# Patient Record
Sex: FEMALE | Race: WHITE | NOT HISPANIC OR LATINO | Employment: OTHER | URBAN - METROPOLITAN AREA
[De-identification: names, ages, dates, MRNs, and addresses within clinical notes are randomized per-mention and may not be internally consistent; named-entity substitution may affect disease eponyms.]

---

## 2023-12-10 DIAGNOSIS — K21.9 GASTROESOPHAGEAL REFLUX DISEASE WITHOUT ESOPHAGITIS: ICD-10-CM

## 2023-12-10 DIAGNOSIS — I10 ESSENTIAL HYPERTENSION: ICD-10-CM

## 2023-12-11 PROBLEM — K21.9 GASTROESOPHAGEAL REFLUX DISEASE: Status: ACTIVE | Noted: 2023-12-11

## 2023-12-11 PROBLEM — M54.2 NECK PAIN: Status: ACTIVE | Noted: 2023-12-11

## 2023-12-11 PROBLEM — F41.9 ANXIETY: Status: ACTIVE | Noted: 2023-12-11

## 2023-12-11 PROBLEM — R92.0 MAMMOGRAPHIC MICROCALCIFICATION FOUND ON DIAGNOSTIC IMAGING OF BREAST: Status: ACTIVE | Noted: 2023-12-11

## 2023-12-11 PROBLEM — E66.9 OBESITY (BMI 30.0-34.9): Status: ACTIVE | Noted: 2023-12-11

## 2023-12-11 PROBLEM — R92.8 ABNORMAL FINDINGS ON DIAGNOSTIC IMAGING OF BREAST: Status: ACTIVE | Noted: 2023-12-11

## 2023-12-11 PROBLEM — N60.19 FIBROCYSTIC BREAST CHANGES: Status: ACTIVE | Noted: 2023-12-11

## 2023-12-11 PROBLEM — N95.1 MENOPAUSAL SYMPTOMS: Status: ACTIVE | Noted: 2023-12-11

## 2023-12-11 PROBLEM — J01.90 ACUTE NON-RECURRENT SINUSITIS: Status: ACTIVE | Noted: 2023-12-11

## 2023-12-11 PROBLEM — G89.29 OTHER CHRONIC PAIN: Status: ACTIVE | Noted: 2023-12-11

## 2023-12-11 PROBLEM — M19.012 PRIMARY OSTEOARTHRITIS, LEFT SHOULDER: Status: ACTIVE | Noted: 2023-12-11

## 2023-12-11 PROBLEM — E78.2 MIXED HYPERLIPIDEMIA: Status: ACTIVE | Noted: 2023-12-11

## 2023-12-11 PROBLEM — N95.2 ATROPHIC VAGINITIS: Status: ACTIVE | Noted: 2023-12-11

## 2023-12-11 PROBLEM — I10 ESSENTIAL HYPERTENSION: Status: ACTIVE | Noted: 2023-12-11

## 2023-12-11 PROBLEM — F10.10 ALCOHOL ABUSE: Status: ACTIVE | Noted: 2023-12-11

## 2023-12-11 PROBLEM — M17.12 ARTHRITIS OF LEFT KNEE: Status: ACTIVE | Noted: 2023-12-11

## 2023-12-11 PROBLEM — N95.1 MENOPAUSAL STATE: Status: ACTIVE | Noted: 2023-12-11

## 2023-12-11 PROBLEM — E55.9 VITAMIN D INSUFFICIENCY: Status: ACTIVE | Noted: 2023-12-11

## 2023-12-11 PROBLEM — E66.811 OBESITY (BMI 30.0-34.9): Status: ACTIVE | Noted: 2023-12-11

## 2023-12-11 PROBLEM — R51.9 HEADACHE: Status: ACTIVE | Noted: 2023-12-11

## 2023-12-11 PROBLEM — G47.30 SLEEP APNEA: Status: ACTIVE | Noted: 2023-12-11

## 2023-12-11 PROBLEM — M25.512 PAIN IN LEFT SHOULDER: Status: ACTIVE | Noted: 2023-12-11

## 2023-12-11 PROBLEM — R92.1 MAMMOGRAPHIC CALCIFICATION FOUND ON DIAGNOSTIC IMAGING OF BREAST: Status: ACTIVE | Noted: 2023-12-11

## 2023-12-15 RX ORDER — METOPROLOL SUCCINATE 50 MG/1
50 TABLET, EXTENDED RELEASE ORAL DAILY
Qty: 90 TABLET | Refills: 0 | Status: SHIPPED | OUTPATIENT
Start: 2023-12-15 | End: 2024-03-22

## 2023-12-15 RX ORDER — OMEPRAZOLE 20 MG/1
CAPSULE, DELAYED RELEASE ORAL
Qty: 90 CAPSULE | Refills: 0 | Status: SHIPPED | OUTPATIENT
Start: 2023-12-15 | End: 2024-03-22

## 2023-12-15 RX ORDER — HYDROCHLOROTHIAZIDE 12.5 MG/1
12.5 CAPSULE ORAL
Qty: 90 CAPSULE | Refills: 0 | Status: SHIPPED | OUTPATIENT
Start: 2023-12-15 | End: 2024-03-22

## 2023-12-15 NOTE — TELEPHONE ENCOUNTER
Pt has been scheduled for a physical on 12/20/23. Pt is currently out of medication, are you able to send supply until her appt.

## 2023-12-20 ENCOUNTER — OFFICE VISIT (OUTPATIENT)
Dept: PRIMARY CARE | Facility: CLINIC | Age: 68
End: 2023-12-20
Payer: COMMERCIAL

## 2023-12-20 VITALS
HEIGHT: 69 IN | OXYGEN SATURATION: 98 % | SYSTOLIC BLOOD PRESSURE: 120 MMHG | TEMPERATURE: 97.2 F | WEIGHT: 198.2 LBS | DIASTOLIC BLOOD PRESSURE: 86 MMHG | BODY MASS INDEX: 29.36 KG/M2 | HEART RATE: 72 BPM

## 2023-12-20 DIAGNOSIS — Z11.59 NEED FOR HEPATITIS C SCREENING TEST: ICD-10-CM

## 2023-12-20 DIAGNOSIS — Z00.00 ROUTINE ADULT HEALTH MAINTENANCE: Primary | ICD-10-CM

## 2023-12-20 DIAGNOSIS — Z23 ENCOUNTER FOR IMMUNIZATION: ICD-10-CM

## 2023-12-20 DIAGNOSIS — E55.9 VITAMIN D INSUFFICIENCY: ICD-10-CM

## 2023-12-20 DIAGNOSIS — Z13.820 SCREENING FOR OSTEOPOROSIS: ICD-10-CM

## 2023-12-20 DIAGNOSIS — Z96.652 STATUS POST LEFT KNEE REPLACEMENT: ICD-10-CM

## 2023-12-20 DIAGNOSIS — Z78.0 POSTMENOPAUSAL STATE: ICD-10-CM

## 2023-12-20 DIAGNOSIS — Z12.31 BREAST CANCER SCREENING BY MAMMOGRAM: ICD-10-CM

## 2023-12-20 DIAGNOSIS — M19.012 PRIMARY OSTEOARTHRITIS, LEFT SHOULDER: ICD-10-CM

## 2023-12-20 DIAGNOSIS — L30.4 INTERTRIGO: ICD-10-CM

## 2023-12-20 DIAGNOSIS — E78.2 MIXED HYPERLIPIDEMIA: ICD-10-CM

## 2023-12-20 DIAGNOSIS — I10 ESSENTIAL HYPERTENSION: ICD-10-CM

## 2023-12-20 DIAGNOSIS — R73.03 PREDIABETES: ICD-10-CM

## 2023-12-20 DIAGNOSIS — K21.9 GASTROESOPHAGEAL REFLUX DISEASE, UNSPECIFIED WHETHER ESOPHAGITIS PRESENT: ICD-10-CM

## 2023-12-20 PROBLEM — R51.9 HEADACHE: Status: RESOLVED | Noted: 2023-12-11 | Resolved: 2023-12-20

## 2023-12-20 PROBLEM — R92.0 MAMMOGRAPHIC MICROCALCIFICATION FOUND ON DIAGNOSTIC IMAGING OF BREAST: Status: RESOLVED | Noted: 2023-12-11 | Resolved: 2023-12-20

## 2023-12-20 PROBLEM — J01.90 ACUTE NON-RECURRENT SINUSITIS: Status: RESOLVED | Noted: 2023-12-11 | Resolved: 2023-12-20

## 2023-12-20 PROBLEM — G89.29 CHRONIC LEFT SHOULDER PAIN: Status: ACTIVE | Noted: 2023-12-20

## 2023-12-20 PROBLEM — R92.1 MAMMOGRAPHIC CALCIFICATION FOUND ON DIAGNOSTIC IMAGING OF BREAST: Status: RESOLVED | Noted: 2023-12-11 | Resolved: 2023-12-20

## 2023-12-20 PROBLEM — M25.512 PAIN IN LEFT SHOULDER: Status: RESOLVED | Noted: 2023-12-11 | Resolved: 2023-12-20

## 2023-12-20 PROBLEM — M25.512 CHRONIC LEFT SHOULDER PAIN: Status: ACTIVE | Noted: 2023-12-20

## 2023-12-20 PROBLEM — M54.2 NECK PAIN: Status: RESOLVED | Noted: 2023-12-11 | Resolved: 2023-12-20

## 2023-12-20 PROBLEM — R92.8 ABNORMAL FINDINGS ON DIAGNOSTIC IMAGING OF BREAST: Status: RESOLVED | Noted: 2023-12-11 | Resolved: 2023-12-20

## 2023-12-20 PROBLEM — N95.1 MENOPAUSAL SYMPTOMS: Status: RESOLVED | Noted: 2023-12-11 | Resolved: 2023-12-20

## 2023-12-20 PROCEDURE — 1125F AMNT PAIN NOTED PAIN PRSNT: CPT | Performed by: FAMILY MEDICINE

## 2023-12-20 PROCEDURE — 3074F SYST BP LT 130 MM HG: CPT | Performed by: FAMILY MEDICINE

## 2023-12-20 PROCEDURE — 1159F MED LIST DOCD IN RCRD: CPT | Performed by: FAMILY MEDICINE

## 2023-12-20 PROCEDURE — 1160F RVW MEDS BY RX/DR IN RCRD: CPT | Performed by: FAMILY MEDICINE

## 2023-12-20 PROCEDURE — 1036F TOBACCO NON-USER: CPT | Performed by: FAMILY MEDICINE

## 2023-12-20 PROCEDURE — G0009 ADMIN PNEUMOCOCCAL VACCINE: HCPCS | Performed by: FAMILY MEDICINE

## 2023-12-20 PROCEDURE — 3079F DIAST BP 80-89 MM HG: CPT | Performed by: FAMILY MEDICINE

## 2023-12-20 PROCEDURE — 99215 OFFICE O/P EST HI 40 MIN: CPT | Mod: 25 | Performed by: FAMILY MEDICINE

## 2023-12-20 PROCEDURE — G0439 PPPS, SUBSEQ VISIT: HCPCS | Performed by: FAMILY MEDICINE

## 2023-12-20 RX ORDER — NYSTATIN 100000 [USP'U]/G
1 POWDER TOPICAL ONCE
Status: CANCELLED | OUTPATIENT
Start: 2023-12-20 | End: 2023-12-20

## 2023-12-20 RX ORDER — MELOXICAM 15 MG/1
15 TABLET ORAL DAILY
Qty: 90 TABLET | Refills: 1 | Status: SHIPPED | OUTPATIENT
Start: 2023-12-20 | End: 2024-06-17

## 2023-12-20 RX ORDER — NYSTATIN 100000 [USP'U]/G
1 POWDER TOPICAL 2 TIMES DAILY
Qty: 60 G | Refills: 1 | Status: SHIPPED | OUTPATIENT
Start: 2023-12-20 | End: 2024-01-19

## 2023-12-20 RX ORDER — MELOXICAM 15 MG/1
15 TABLET ORAL DAILY
COMMUNITY
Start: 2023-03-24 | End: 2023-12-20 | Stop reason: SDUPTHER

## 2023-12-20 RX ORDER — CYCLOBENZAPRINE HCL 5 MG
5 TABLET ORAL 3 TIMES DAILY PRN
Qty: 30 TABLET | Refills: 0 | Status: SHIPPED | OUTPATIENT
Start: 2023-12-20 | End: 2023-12-30

## 2023-12-20 ASSESSMENT — PAIN SCALES - GENERAL: PAINLEVEL: 10-WORST PAIN EVER

## 2023-12-20 ASSESSMENT — PATIENT HEALTH QUESTIONNAIRE - PHQ9
1. LITTLE INTEREST OR PLEASURE IN DOING THINGS: NOT AT ALL
2. FEELING DOWN, DEPRESSED OR HOPELESS: NOT AT ALL
SUM OF ALL RESPONSES TO PHQ9 QUESTIONS 1 AND 2: 0

## 2023-12-20 ASSESSMENT — ENCOUNTER SYMPTOMS
LOSS OF SENSATION IN FEET: 0
DEPRESSION: 0
OCCASIONAL FEELINGS OF UNSTEADINESS: 0

## 2023-12-20 NOTE — PATIENT INSTRUCTIONS
Problem List Items Addressed This Visit             ICD-10-CM    Gastroesophageal reflux disease K21.9     - Stable         Essential hypertension I10     - Blood pressure stable on current regimen  -Will continue on current medications along with healthy, balanced diet with moderation of salt/caffeine/alcohol         Relevant Orders    TSH with reflex to Free T4 if abnormal    Lipid Panel    Comprehensive Metabolic Panel    CBC    Mixed hyperlipidemia E78.2    Relevant Orders    TSH with reflex to Free T4 if abnormal    Lipid Panel    Comprehensive Metabolic Panel    Primary osteoarthritis, left shoulder M19.012     - Will continue with meloxicam and as needed muscle relaxant secondary to acute flare  -Continue with additional supportive care including modified activity, ice/heat and additional Tylenol as needed  -Will plan to scope Ortho consultation after everything has been visually finished with your knee         Relevant Medications    meloxicam (Mobic) 15 mg tablet    cyclobenzaprine (Flexeril) 5 mg tablet    Vitamin D insufficiency E55.9     - Will monitor vitamin D level as levels were elevated above normal with last panel completed in March         Relevant Orders    Vitamin D 25-Hydroxy,Total (for eval of Vitamin D levels)    Prediabetes R73.03     - Will check A1c sugar average with blood work ordered today  -Continue focus on healthy, low-fat with moderation of carbohydrates         Relevant Orders    Hemoglobin A1c    Status post left knee replacement Z96.652     - Continue with physical therapy exercises per protocol         Intertrigo L30.4     - Will trial prescription grade antifungal powder with dermatology referral placed         Relevant Medications    nystatin (Mycostatin) 100,000 unit/gram powder    Other Relevant Orders    Referral to Dermatology     Other Visit Diagnoses         Codes    Routine adult health maintenance    -  Primary Z00.00    Relevant Orders    TSH with reflex to Free T4 if  abnormal    Lipid Panel    Comprehensive Metabolic Panel    CBC    Hepatitis C antibody    Pneumococcal conjugate vaccine, 20-valent (PREVNAR 20)    BI mammo bilateral screening tomosynthesis    XR DEXA bone density    Breast cancer screening by mammogram     Z12.31    Relevant Orders    BI mammo bilateral screening tomosynthesis    Need for hepatitis C screening test     Z11.59    Relevant Orders    Hepatitis C antibody    Encounter for immunization     Z23    Relevant Orders    Pneumococcal conjugate vaccine, 20-valent (PREVNAR 20)    Postmenopausal state     Z78.0    Relevant Orders    XR DEXA bone density    Screening for osteoporosis     Z13.820    Relevant Orders    XR DEXA bone density            Additional Visit Plans:  Notes:  PREVENTATIVE HEALTH SCREENINGS INCLUDED:  - Blood pressure screen.  - Blood work may include a cholesterol and diabetes screen if risk factors exist (overweight, high blood pressure etc); screening for sexually transmitted infections; a one time HIV screen for all individuals, and a one time Hepatitis C Virus screen for those born between 1636-5135.  - I encourage you to eat a low-fat, moderate-carbohydrate, low-calorie diet to maintain a normal BMI (under 25) to reduce heart disease, and risk for diabetes  - Moderate intensity exercise for 30 minutes 5 days per week is recommended  - Along with recommendations for nutrition and exercise discussed today helpful resources recommended by the American Academy of Family Practice can be found at www.familydoctor.org or www.choosemyplate.gov    - Colon cancer screening for all ages 45-75 or 85 years old periodically depending on results.  - Cervical cancer screening (pap test) in women between 21-65 years old, periodically depending on results.  - Mammogram screening for breast cancer in women starting at 40-50 years and every 1-2 years.  - For men and women who have a 30 pack year smoking history and currently smoke or have quit in the  past 15 years, screening for lung cancer with a yearly low dose CT scan is recommended starting at age 55 until age 80 years  - For men only who have smoked 100+ cigarettes anytime in their lifetime, a one time ultrasound screening for for abdominal aortic aneurysms starting at age 65 until 75 years old  - Bone density screening (DEXA) for osteoporosis in women aged 65 years and older, once every two years if needed.    IMMUNIZATIONS:  - Flu shot annually.  - Tetanus booster every 10 years.  - Two pneumococcal vaccinations after 65 years old.  - Shingles vaccine for those 50 years or older.     This was a shared decision making visit.    Next Wellness Exam Due  In 1 year from today      Erick Porter,    12/20/23   11:11 AM

## 2023-12-20 NOTE — PROGRESS NOTES
Outpatient Visit Note    Chief Complaint   Patient presents with    Annual Exam    Med Refill       HPI:  Christina Arrington is a 68 y.o. female who presents to the office for an annual Medicare Wellness Visit.    She was last seen in the office on 8/15/2023 by Dr. Bunn secondary to foot/ankle complaints.    Past medical history significant for hypertension, hyperlipidemia, prediabetes, anxiety, GERD, osteoarthritis, CAITLYN and vitamin D deficiency.    Of note, patient did undergo left knee replacement in November at [facility].    Last panel blood work on file completed in March 2023 including A1c, CBC, CMP, lipid panel, TSH, vitamin D and vitamin B12.  Blood work was remarkable for elevated H&H with hyperglycemia and prediabetic A1c of 5.7%.  Blood work additionally noted hyperlipidemia with elevated vitamin B12 and vitamin D levels.    Well exam:  Overall, she describes her health as good with no reports of recent illness or hospitalization.  She states that her diet is fair though she has continued to struggle with her weight. In regards to physical activity, she has remained restricted following her left knee replacement though has made significant strides with physical therapy and hopes to increase activity going forward.  She denies any significant sleep complaints.  She denies issues of chest pain, shortness of breath, headaches, vision/hearing changes, abdominal pain, vomiting, diarrhea, melena, hematochezia, constipation or urinary symptoms.       Preventative Health Maintenance:  In regards to preventative health maintenance, last Tdap received unknown. Flu shot due at this time. Pneumonia vaccination series not previously initiated per records. In regards to CRC screening, no prior screening on file though she does admit to having successfully completed a colonoscopy through [facility] within the last 5 years. Shingles vaccination not previously needed initiated to date.  COVID-19 vaccine  series completed with patient currently due for booster. Last Mammogram completed in 2021 per chart records. Last Pap completed in 2021. DEXA scan not completed to date per chart notes.    Does continue to struggle with chronic left shoulder osteoarthritis.  Does state to have flared up her shoulder pain recently after moving furniture at her father's house.  Continues to utilize meloxicam and Tylenol though recent pain has been intense.  Does admit to sleep disruption.  Has previously utilized steroid packs to which she has had no significant improvement.  Going forward, she is planning to discuss with her knee surgeon about this linking up with a recommended orthopedic shoulder specialist to proceed with probable replacement.    She also notes ongoing struggles with skin irritation primarily in the creases of her groin and under her breasts.  Has diligently attempted OTC antifungal/antichafing products with no sniffing and improvement.      Advanced directives: Organ donor    Hearing screen: reports no difficulty with hearing and passes finger rub test bilaterally    Does the patient use opioid medications: No  Name of medication: Not applicable  If yes, do they take this medicine appropriately: Not applicable    How does the patient rate their health status today: Good    Cognitive Screen:  AAAx3  to person, place and time: Yes/No  3 word recall: Apple, Car, Shoe - Immediate recall: Yes/No         - 5 minutes recall: Yes/No   Impression: No cognitive deficiency observed during screening or encounter today?      Reviewed:   Past Medical History/Allergies:  Yes  Family History:  Yes  Social History:  Yes  Current Medications:  Yes  Vital Signs:  Yes  Advanced Directives:  discussed  Immunizations:  reviewed today  Home Safety:                    Up & Go test > 30 seconds?  No                   Home have rugs; lack grab bars in bathroom; lack handrail on stairs; have poor lighting?  No                   Hearing  difficulties?  No  Geriatric Assessment                   ADL areas requiring assistance:  Does not need help with Dressing, Eating, Ambulating, Toileting, Grooming, Hygiene.                    IADL areas requiring assistance:  Does not need help with Shopping, Housework, Accounting, Transportation, Driving.   Medications: reviewed  Current supplements:  Reviewed and recorded.   Other providers: Reviewed and recorded - Current providers and suppliers: Dr. Porter.  F orthopedics/physical therapy             Past Medical History:   Diagnosis Date    Anxiety     GERD (gastroesophageal reflux disease)     Hyperlipidemia     Hypertension     CAITLYN (obstructive sleep apnea)     Osteoarthritis         Current Medications  Current Outpatient Medications   Medication Instructions    cyclobenzaprine (FLEXERIL) 5 mg, oral, 3 times daily PRN    hydroCHLOROthiazide (MICROZIDE) 12.5 mg, oral, Daily before breakfast    meloxicam (MOBIC) 15 mg, oral, Daily    metoprolol succinate XL (TOPROL-XL) 50 mg, oral, Daily    nystatin (Mycostatin) 100,000 unit/gram powder 1 Application, Topical, 2 times daily    omeprazole (PriLOSEC) 20 mg DR capsule TAKE ONE CAPSULE BY MOUTH EVERY DAY 30 MINUTES BEFORE MORNING MEAL        Allergies  Allergies   Allergen Reactions    Penicillin V Anaphylaxis    Sulfacetamide Sodium-Sulfur Anaphylaxis        Immunizations  Immunization History   Administered Date(s) Administered    Flu vaccine, quadrivalent, high-dose, preservative free, age 65y+ (FLUZONE) 09/29/2022    Influenza, Seasonal, Quadrivalent, Adjuvanted 10/22/2020, 10/21/2021, 11/03/2023    Influenza, injectable, quadrivalent 10/09/2019    Moderna SARS-CoV-2 Vaccination 03/10/2021, 11/12/2021    Pneumococcal conjugate vaccine, 20-valent (PREVNAR 20) 12/20/2023    Zoster vaccine, recombinant, adult (SHINGRIX) 10/21/2021        Past Surgical History:   Procedure Laterality Date    BI STEREOTACTIC GUIDED BREAST RIGHT LOCALIZATION AND BIOPSY Right  08/19/2021    BI STEREOTACTIC GUIDED BREAST LOCALIZATION AND BIOPSY RIGHT [facility]    TOTAL KNEE ARTHROPLASTY Left 11/10/2023     No family history on file.  Social History     Tobacco Use    Smoking status: Never    Smokeless tobacco: Never   Vaping Use    Vaping Use: Never used   Substance Use Topics    Alcohol use: Not Currently    Drug use: Never     Tobacco Use: Low Risk  (12/20/2023)    Patient History     Smoking Tobacco Use: Never     Smokeless Tobacco Use: Never     Passive Exposure: Not on file        ROS  All pertinent positive symptoms are included in the history of present illness.  All other systems have been reviewed and are negative and noncontributory to this patient's current ailments.    VITAL SIGNS  Vitals:    12/20/23 1039   Weight: 89.9 kg (198 lb 3.2 oz)      Body mass index is 29.27 kg/m².     PHYSICAL EXAM  GENERAL APPEARANCE: well nourished, well developed, looks like stated age, in no acute distress, not ill or tired appearing, conversing well.   HEENT: no trauma, normocephalic. PERRLA and EOMI with normal external exam. TM's intact with no injection or effusion, no signs of infection. Nares patent, turbinates pink without discharge. Pharynx pink with no exudates or lesions, no enlarged tonsils.   NECK: no nodes, supple without rigidity, no neck mass was observed, no thyromegaly or thyroid nodules.   HEART: regular rate and rhythm, S1 and S2 heard with no murmurs or skipped beats.   LUNGS: clear to auscultation bilaterally with no wheezes, crackles or rales.   ABDOMEN: no organomegaly, soft, nontender, nondistended, no guarding/rebound/rigidity.   EXTREMITIES: Left glenohumeral active and passive range of motion restriction secondary to pain, no edema  SKIN: normal skin color and pigmentation, normal skin turgor without rash, lesions, or nodules visualized.   NEUROLOGIC EXAM: CN II-XII grossly intact, normal gait, normal balance, 5/5 muscle strength, sensation grossly intact.    PSYCH: mood and affect appropriate; alert and oriented to time, place, person; no difficulty with speech or language.       Preventative Services reviewed with patient and copy printed for patient.    Assessment/Plan   Problem List Items Addressed This Visit             ICD-10-CM    Gastroesophageal reflux disease K21.9     - Stable         Essential hypertension I10     - Blood pressure stable on current regimen  -Will continue on current medications along with healthy, balanced diet with moderation of salt/caffeine/alcohol         Relevant Orders    TSH with reflex to Free T4 if abnormal    Lipid Panel    Comprehensive Metabolic Panel    CBC    Mixed hyperlipidemia E78.2    Relevant Orders    TSH with reflex to Free T4 if abnormal    Lipid Panel    Comprehensive Metabolic Panel    Primary osteoarthritis, left shoulder M19.012     - Will continue with meloxicam and as needed muscle relaxant secondary to acute flare  -Continue with additional supportive care including modified activity, ice/heat and additional Tylenol as needed  -Will plan to scope Ortho consultation after everything has been visually finished with your knee         Relevant Medications    meloxicam (Mobic) 15 mg tablet    cyclobenzaprine (Flexeril) 5 mg tablet    Vitamin D insufficiency E55.9     - Will monitor vitamin D level as levels were elevated above normal with last panel completed in March         Relevant Orders    Vitamin D 25-Hydroxy,Total (for eval of Vitamin D levels)    Prediabetes R73.03     - Will check A1c sugar average with blood work ordered today  -Continue focus on healthy, low-fat with moderation of carbohydrates         Relevant Orders    Hemoglobin A1c    Status post left knee replacement Z96.652     - Continue with physical therapy exercises per protocol         Intertrigo L30.4     - Will trial prescription grade antifungal powder with dermatology referral placed         Relevant Medications    nystatin (Mycostatin)  100,000 unit/gram powder    Other Relevant Orders    Referral to Dermatology     Other Visit Diagnoses         Codes    Routine adult health maintenance    -  Primary Z00.00    Relevant Orders    TSH with reflex to Free T4 if abnormal    Lipid Panel    Comprehensive Metabolic Panel    CBC    Hepatitis C antibody    Pneumococcal conjugate vaccine, 20-valent (PREVNAR 20) (Completed)    BI mammo bilateral screening tomosynthesis    XR DEXA bone density    Breast cancer screening by mammogram     Z12.31    Relevant Orders    BI mammo bilateral screening tomosynthesis    Need for hepatitis C screening test     Z11.59    Relevant Orders    Hepatitis C antibody    Encounter for immunization     Z23    Relevant Orders    Pneumococcal conjugate vaccine, 20-valent (PREVNAR 20) (Completed)    Postmenopausal state     Z78.0    Relevant Orders    XR DEXA bone density    Screening for osteoporosis     Z13.820    Relevant Orders    XR DEXA bone density            Additional Visit Plans:  Notes:  PREVENTATIVE HEALTH SCREENINGS INCLUDED:  - Blood pressure screen.  - Blood work may include a cholesterol and diabetes screen if risk factors exist (overweight, high blood pressure etc); screening for sexually transmitted infections; a one time HIV screen for all individuals, and a one time Hepatitis C Virus screen for those born between 4342-9883.  - I encourage you to eat a low-fat, moderate-carbohydrate, low-calorie diet to maintain a normal BMI (under 25) to reduce heart disease, and risk for diabetes  - Moderate intensity exercise for 30 minutes 5 days per week is recommended  - Along with recommendations for nutrition and exercise discussed today helpful resources recommended by the American Academy of Family Practice can be found at www.familydoctor.org or www.choosemyplate.gov    - Colon cancer screening for all ages 45-75 or 85 years old periodically depending on results.  - Cervical cancer screening (pap test) in women between  21-65 years old, periodically depending on results.  - Mammogram screening for breast cancer in women starting at 40-50 years and every 1-2 years.  - For men and women who have a 30 pack year smoking history and currently smoke or have quit in the past 15 years, screening for lung cancer with a yearly low dose CT scan is recommended starting at age 55 until age 80 years  - For men only who have smoked 100+ cigarettes anytime in their lifetime, a one time ultrasound screening for for abdominal aortic aneurysms starting at age 65 until 75 years old  - Bone density screening (DEXA) for osteoporosis in women aged 65 years and older, once every two years if needed.    IMMUNIZATIONS:  - Flu shot annually.  - Tetanus booster every 10 years.  - Two pneumococcal vaccinations after 65 years old.  - Shingles vaccine for those 50 years or older.     This was a shared decision making visit.    Next Wellness Exam Due  In 1 year from today      Erick Porter,    12/20/23   12:37 PM

## 2023-12-20 NOTE — ASSESSMENT & PLAN NOTE
- Will check A1c sugar average with blood work ordered today  -Continue focus on healthy, low-fat with moderation of carbohydrates

## 2023-12-20 NOTE — ASSESSMENT & PLAN NOTE
- Blood pressure stable on current regimen  -Will continue on current medications along with healthy, balanced diet with moderation of salt/caffeine/alcohol

## 2023-12-20 NOTE — ASSESSMENT & PLAN NOTE
- Will monitor vitamin D level as levels were elevated above normal with last panel completed in March

## 2023-12-20 NOTE — ASSESSMENT & PLAN NOTE
- Will continue with meloxicam and as needed muscle relaxant secondary to acute flare  -Continue with additional supportive care including modified activity, ice/heat and additional Tylenol as needed  -Will plan to scope Ortho consultation after everything has been visually finished with your knee

## 2024-01-18 ENCOUNTER — TELEMEDICINE (OUTPATIENT)
Dept: PRIMARY CARE | Facility: CLINIC | Age: 69
End: 2024-01-18
Payer: COMMERCIAL

## 2024-01-18 DIAGNOSIS — J06.9 ACUTE URI: Primary | ICD-10-CM

## 2024-01-18 PROCEDURE — 99442 PR PHYS/QHP TELEPHONE EVALUATION 11-20 MIN: CPT | Performed by: FAMILY MEDICINE

## 2024-01-18 RX ORDER — CETIRIZINE HYDROCHLORIDE 10 MG/1
10 TABLET ORAL DAILY
Qty: 30 TABLET | Refills: 1 | Status: SHIPPED | OUTPATIENT
Start: 2024-01-18 | End: 2024-07-16

## 2024-01-18 RX ORDER — BENZONATATE 100 MG/1
100 CAPSULE ORAL 3 TIMES DAILY PRN
Qty: 30 CAPSULE | Refills: 0 | Status: SHIPPED | OUTPATIENT
Start: 2024-01-18 | End: 2024-01-28

## 2024-01-18 ASSESSMENT — PAIN SCALES - GENERAL: PAINLEVEL: 0-NO PAIN

## 2024-01-18 ASSESSMENT — PATIENT HEALTH QUESTIONNAIRE - PHQ9
SUM OF ALL RESPONSES TO PHQ9 QUESTIONS 1 AND 2: 0
1. LITTLE INTEREST OR PLEASURE IN DOING THINGS: NOT AT ALL
2. FEELING DOWN, DEPRESSED OR HOPELESS: NOT AT ALL

## 2024-01-18 NOTE — PROGRESS NOTES
Outpatient Visit Note    Chief Complaint   Patient presents with    Cough     nonproductive cough, headache, tested negative for covid at home, states her grandchildren were positive for rsv, began last Tuesday    Headache       With patient's permission, this is a Telemedicine visit with audio only. The provider and patient participated in this telemedicine encounter.    HPI:  Christina Arrington is a 68 y.o. female with a past medical history significant for hypertension, hyperlipidemia, prediabetes, anxiety, GERD, osteoarthritis, CAITLYN and vitamin D deficiency. who presents to the office via telemedicine encounter secondary to complaints of acute illness/nonproductive cough.  She was last seen in the office on 12/20/2023 for annual well exam.    She reports approximately 1.5 weeks of nonproductive cough with associated headache.  Does admit to having been around her grandchild who tested positive for RSV.  COVID-19 test was completed and negative.  States that symptoms in general have been gradually improving though she continues to have prominent drainage and cough which she has attempted to manage with DayQuil/NyQuil with limited symptom only temporary symptom relief          Current Medications  Current Outpatient Medications   Medication Instructions    benzonatate (TESSALON) 100 mg, oral, 3 times daily PRN, Do not crush or chew.    cetirizine (ZYRTEC) 10 mg, oral, Daily    hydroCHLOROthiazide (MICROZIDE) 12.5 mg, oral, Daily before breakfast    meloxicam (MOBIC) 15 mg, oral, Daily    metoprolol succinate XL (TOPROL-XL) 50 mg, oral, Daily    nystatin (Mycostatin) 100,000 unit/gram powder 1 Application, Topical, 2 times daily    omeprazole (PriLOSEC) 20 mg DR capsule TAKE ONE CAPSULE BY MOUTH EVERY DAY 30 MINUTES BEFORE MORNING MEAL        Allergies  Allergies   Allergen Reactions    Penicillin V Anaphylaxis    Sulfacetamide Sodium-Sulfur Anaphylaxis        Past Medical History:   Diagnosis Date    Anxiety      GERD (gastroesophageal reflux disease)     Hyperlipidemia     Hypertension     CAITLYN (obstructive sleep apnea)     Osteoarthritis       Past Surgical History:   Procedure Laterality Date    BI STEREOTACTIC GUIDED BREAST RIGHT LOCALIZATION AND BIOPSY Right 08/19/2021    BI STEREOTACTIC GUIDED BREAST LOCALIZATION AND BIOPSY RIGHT [facility]    TOTAL KNEE ARTHROPLASTY Left 11/10/2023     No family history on file.  Social History     Tobacco Use    Smoking status: Never    Smokeless tobacco: Never   Vaping Use    Vaping Use: Never used   Substance Use Topics    Alcohol use: Not Currently    Drug use: Never     Tobacco Use: Low Risk  (1/18/2024)    Patient History     Smoking Tobacco Use: Never     Smokeless Tobacco Use: Never     Passive Exposure: Not on file        ROS  All pertinent positive symptoms are included in the history of present illness.  All other systems have been reviewed and are negative and noncontributory to this patient's current ailments.    VITAL SIGNS  Patient is unable to provide    PHYSICAL EXAM   Telemedicine visit, no exam component done.      Assessment/Plan   Problem List Items Addressed This Visit             ICD-10-CM    Acute URI - Primary J06.9     - Symptoms likely viral in nature with high suspicion for RSV infection following recent exposure  - Recommend supportive care with increased fluid intake to thin secretions, Ibuprofen or Tylenol as needed for pain or fever, and steamy showers/saline nasal rinses to help clear the nasal passages   - You may consider tea with honey or a cinnamon stick as these have natural antiviral and antibiotic properties   -With persistent cough, Tessalon Perles sent to pharmacy along with generic Zyrtec to help dry up congestion  -If symptoms persist, please contact office and we can determine if additional therapeutic options are necessary including potential antibiotic course         Relevant Medications    benzonatate (Tessalon) 100 mg capsule     cetirizine (ZyrTEC) 10 mg tablet       Counseling:       Medication education:         Education:  The patient is counseled regarding potential side-effects of all new medications        Understanding:  Patient expressed understanding        Adherence:  No barriers to adherence identified

## 2024-01-18 NOTE — PATIENT INSTRUCTIONS
Problem List Items Addressed This Visit             ICD-10-CM    Acute URI - Primary J06.9     - Symptoms likely viral in nature with high suspicion for RSV infection following recent exposure  - Recommend supportive care with increased fluid intake to thin secretions, Ibuprofen or Tylenol as needed for pain or fever, and steamy showers/saline nasal rinses to help clear the nasal passages   - You may consider tea with honey or a cinnamon stick as these have natural antiviral and antibiotic properties   -With persistent cough, Tessalon Perles sent to pharmacy along with generic Zyrtec to help dry up congestion  -If symptoms persist, please contact office and we can determine if additional therapeutic options are necessary including potential antibiotic course         Relevant Medications    benzonatate (Tessalon) 100 mg capsule    cetirizine (ZyrTEC) 10 mg tablet       Counseling:       Medication education:         Education:  The patient is counseled regarding potential side-effects of all new medications        Understanding:  Patient expressed understanding        Adherence:  No barriers to adherence identified

## 2024-01-18 NOTE — ASSESSMENT & PLAN NOTE
- Symptoms likely viral in nature with high suspicion for RSV infection following recent exposure  - Recommend supportive care with increased fluid intake to thin secretions, Ibuprofen or Tylenol as needed for pain or fever, and steamy showers/saline nasal rinses to help clear the nasal passages   - You may consider tea with honey or a cinnamon stick as these have natural antiviral and antibiotic properties   -With persistent cough, Tessalon Perles sent to pharmacy along with generic Zyrtec to help dry up congestion  -If symptoms persist, please contact office and we can determine if additional therapeutic options are necessary including potential antibiotic course

## 2024-02-06 ENCOUNTER — TELEPHONE (OUTPATIENT)
Dept: PRIMARY CARE | Facility: CLINIC | Age: 69
End: 2024-02-06
Payer: COMMERCIAL

## 2024-02-06 DIAGNOSIS — J06.9 UPPER RESPIRATORY TRACT INFECTION, UNSPECIFIED TYPE: Primary | ICD-10-CM

## 2024-02-06 RX ORDER — AZITHROMYCIN 250 MG/1
TABLET, FILM COATED ORAL
Qty: 6 TABLET | Refills: 0 | Status: SHIPPED | OUTPATIENT
Start: 2024-02-06 | End: 2024-02-11

## 2024-02-06 RX ORDER — BENZONATATE 100 MG/1
100 CAPSULE ORAL 3 TIMES DAILY PRN
Qty: 30 CAPSULE | Refills: 0 | Status: SHIPPED | OUTPATIENT
Start: 2024-02-06 | End: 2024-02-16

## 2024-02-06 NOTE — TELEPHONE ENCOUNTER
Previous encounter reviewed.  Patient did have viral symptoms before that with recent secondary upper respiratory infection I do have concerns for possible bacterial infection.  At this time I will send her azithromycin Z-Jeremias and refill on Tessalon Perles.  If symptoms continue, would recommend sending follow-up appointment

## 2024-02-06 NOTE — TELEPHONE ENCOUNTER
Pt had a virtual apt for URI on 1/18/2024, pt states has another respiratory inf has a cough, sinus drainage. Does she need a new apt??

## 2024-03-20 DIAGNOSIS — I10 ESSENTIAL HYPERTENSION: ICD-10-CM

## 2024-03-20 DIAGNOSIS — K21.9 GASTROESOPHAGEAL REFLUX DISEASE WITHOUT ESOPHAGITIS: ICD-10-CM

## 2024-03-22 RX ORDER — METOPROLOL SUCCINATE 50 MG/1
50 TABLET, EXTENDED RELEASE ORAL DAILY
Qty: 90 TABLET | Refills: 1 | Status: SHIPPED | OUTPATIENT
Start: 2024-03-22

## 2024-03-22 RX ORDER — HYDROCHLOROTHIAZIDE 12.5 MG/1
12.5 CAPSULE ORAL
Qty: 90 CAPSULE | Refills: 1 | Status: SHIPPED | OUTPATIENT
Start: 2024-03-22

## 2024-03-22 RX ORDER — OMEPRAZOLE 20 MG/1
CAPSULE, DELAYED RELEASE ORAL
Qty: 90 CAPSULE | Refills: 1 | Status: SHIPPED | OUTPATIENT
Start: 2024-03-22

## 2024-06-16 DIAGNOSIS — M19.012 PRIMARY OSTEOARTHRITIS, LEFT SHOULDER: ICD-10-CM

## 2024-06-18 RX ORDER — MELOXICAM 15 MG/1
15 TABLET ORAL DAILY
Qty: 90 TABLET | Refills: 0 | Status: SHIPPED | OUTPATIENT
Start: 2024-06-18

## 2024-06-18 NOTE — TELEPHONE ENCOUNTER
90-day prescription refill sent.  Please encourage patient to schedule follow-up at earliest convenience

## 2024-09-14 DIAGNOSIS — M19.012 PRIMARY OSTEOARTHRITIS, LEFT SHOULDER: ICD-10-CM

## 2024-09-14 DIAGNOSIS — I10 ESSENTIAL HYPERTENSION: ICD-10-CM

## 2024-09-14 DIAGNOSIS — K21.9 GASTROESOPHAGEAL REFLUX DISEASE WITHOUT ESOPHAGITIS: ICD-10-CM

## 2024-09-18 NOTE — TELEPHONE ENCOUNTER
Refill Request:    cetirizine (ZyrTEC) 10 mg tablet Take 1 tablet (10 mg) by mouth once daily.   Number of times this order has been      hydroCHLOROthiazide (Microzide) 12.5 mg capsule TAKE ONE CAPSULE BY MOUTH EVERY MORNING     meloxicam (Mobic) 15 mg tablet TAKE ONE TABLET BY MOUTH ONCE DAILY     metoprolol succinate XL (Toprol-XL) 50 mg 24 hr tablet TAKE ONE TABLET BY MOUTH EVERY DAY     omeprazole (PriLOSEC) 20 mg DR capsule TAKE ONE CAPSULE BY MOUTH EVERY DAY 30 MINUTES BEFORE MORNING MEAL     Pt is out of all medications    Last OV- 12-20-23-PE                 01-18-24-[facility]  Virtual ,Cough, Headache    Next OV- 10-08-24- Medication Check.

## 2024-09-19 RX ORDER — OMEPRAZOLE 20 MG/1
CAPSULE, DELAYED RELEASE ORAL
Qty: 90 CAPSULE | Refills: 0 | Status: SHIPPED | OUTPATIENT
Start: 2024-09-19

## 2024-09-19 RX ORDER — HYDROCHLOROTHIAZIDE 12.5 MG/1
12.5 CAPSULE ORAL
Qty: 90 CAPSULE | Refills: 0 | Status: SHIPPED | OUTPATIENT
Start: 2024-09-19

## 2024-09-19 RX ORDER — MELOXICAM 15 MG/1
15 TABLET ORAL DAILY
Qty: 90 TABLET | Refills: 0 | Status: SHIPPED | OUTPATIENT
Start: 2024-09-19

## 2024-09-19 RX ORDER — METOPROLOL SUCCINATE 50 MG/1
50 TABLET, EXTENDED RELEASE ORAL DAILY
Qty: 90 TABLET | Refills: 0 | Status: SHIPPED | OUTPATIENT
Start: 2024-09-19

## 2024-09-19 NOTE — TELEPHONE ENCOUNTER
90-day prescription refill sent.  Patient will be due for follow-up/well exam prior to next refill request.  If patient is not able to follow-up with us to the [place] office, will need to set up with new PCP.

## 2024-10-08 ENCOUNTER — OFFICE VISIT (OUTPATIENT)
Dept: PRIMARY CARE | Facility: CLINIC | Age: 69
End: 2024-10-08
Payer: COMMERCIAL

## 2024-10-08 VITALS
SYSTOLIC BLOOD PRESSURE: 130 MMHG | BODY MASS INDEX: 29.92 KG/M2 | OXYGEN SATURATION: 95 % | WEIGHT: 202 LBS | HEIGHT: 69 IN | DIASTOLIC BLOOD PRESSURE: 82 MMHG | HEART RATE: 69 BPM | TEMPERATURE: 97.1 F

## 2024-10-08 DIAGNOSIS — E55.9 VITAMIN D INSUFFICIENCY: ICD-10-CM

## 2024-10-08 DIAGNOSIS — Z23 ENCOUNTER FOR IMMUNIZATION: ICD-10-CM

## 2024-10-08 DIAGNOSIS — K21.9 GASTROESOPHAGEAL REFLUX DISEASE, UNSPECIFIED WHETHER ESOPHAGITIS PRESENT: ICD-10-CM

## 2024-10-08 DIAGNOSIS — G89.29 CHRONIC LEFT SHOULDER PAIN: ICD-10-CM

## 2024-10-08 DIAGNOSIS — E53.8 B12 DEFICIENCY: ICD-10-CM

## 2024-10-08 DIAGNOSIS — R73.03 PREDIABETES: ICD-10-CM

## 2024-10-08 DIAGNOSIS — M19.012 PRIMARY OSTEOARTHRITIS, LEFT SHOULDER: ICD-10-CM

## 2024-10-08 DIAGNOSIS — E78.2 MIXED HYPERLIPIDEMIA: ICD-10-CM

## 2024-10-08 DIAGNOSIS — Z01.419 WELL FEMALE EXAM WITH ROUTINE GYNECOLOGICAL EXAM: ICD-10-CM

## 2024-10-08 DIAGNOSIS — I10 ESSENTIAL HYPERTENSION: Primary | ICD-10-CM

## 2024-10-08 DIAGNOSIS — M25.512 CHRONIC LEFT SHOULDER PAIN: ICD-10-CM

## 2024-10-08 PROBLEM — R20.2 ARM PARESTHESIA, LEFT: Status: ACTIVE | Noted: 2024-10-08

## 2024-10-08 PROBLEM — J06.9 ACUTE URI: Status: RESOLVED | Noted: 2024-01-18 | Resolved: 2024-10-08

## 2024-10-08 PROCEDURE — 3008F BODY MASS INDEX DOCD: CPT | Performed by: FAMILY MEDICINE

## 2024-10-08 PROCEDURE — 3079F DIAST BP 80-89 MM HG: CPT | Performed by: FAMILY MEDICINE

## 2024-10-08 PROCEDURE — 1126F AMNT PAIN NOTED NONE PRSNT: CPT | Performed by: FAMILY MEDICINE

## 2024-10-08 PROCEDURE — 1036F TOBACCO NON-USER: CPT | Performed by: FAMILY MEDICINE

## 2024-10-08 PROCEDURE — 90662 IIV NO PRSV INCREASED AG IM: CPT | Performed by: FAMILY MEDICINE

## 2024-10-08 PROCEDURE — 1124F ACP DISCUSS-NO DSCNMKR DOCD: CPT | Performed by: FAMILY MEDICINE

## 2024-10-08 PROCEDURE — 3075F SYST BP GE 130 - 139MM HG: CPT | Performed by: FAMILY MEDICINE

## 2024-10-08 PROCEDURE — 99214 OFFICE O/P EST MOD 30 MIN: CPT | Performed by: FAMILY MEDICINE

## 2024-10-08 PROCEDURE — 1160F RVW MEDS BY RX/DR IN RCRD: CPT | Performed by: FAMILY MEDICINE

## 2024-10-08 PROCEDURE — 1159F MED LIST DOCD IN RCRD: CPT | Performed by: FAMILY MEDICINE

## 2024-10-08 ASSESSMENT — PATIENT HEALTH QUESTIONNAIRE - PHQ9
1. LITTLE INTEREST OR PLEASURE IN DOING THINGS: NOT AT ALL
SUM OF ALL RESPONSES TO PHQ9 QUESTIONS 1 AND 2: 0
2. FEELING DOWN, DEPRESSED OR HOPELESS: NOT AT ALL

## 2024-10-08 ASSESSMENT — PAIN SCALES - GENERAL: PAINLEVEL: 0-NO PAIN

## 2024-10-08 NOTE — ASSESSMENT & PLAN NOTE
- Will continue with meloxicam regimen with plans to see orthopedics if symptoms persist/progress  -Shoulder orthopedic referral given   Tanika [id number]/25&nbsp;SN &nbsp; &nbsp; mbr

## 2024-10-08 NOTE — PROGRESS NOTES
Outpatient Visit Note    Chief Complaint   Patient presents with    Follow-up     Med f/u         HPI:  Christina Arrington is a 69 y.o. female who presents to the office for follow-up, having been seen on 12/20/2023 for annual Medicare Wellness Visit.  She was additionally seen in interval via telemedicine encounter in January for acute URI.    Past medical history significant for hypertension, hyperlipidemia, prediabetes, anxiety, GERD, osteoarthritis, CAITLYN and vitamin D deficiency.    Of note, patient did undergo left knee replacement in November 2023 at [facility].    Last panel blood work on file completed in March 2023 including A1c, CBC, CMP, lipid panel, TSH, vitamin D and vitamin B12.  Blood work was remarkable for elevated H&H with hyperglycemia and prediabetic A1c of 5.7%.  Blood work additionally noted hyperlipidemia with elevated vitamin B12 and vitamin D levels.  Orders were given for repeat blood work at annual well exam which have yet to be completed.    Overall, she describes her health as good with no reports of recent illness or hospitalization.  She states that her diet is fair though she has continued to struggle with her weight. In regards to physical activity, activity level steadily increased following rehabilitation from knee replacement.  She denies any significant sleep complaints.  She denies issues of chest pain, shortness of breath, headaches, vision/hearing changes, abdominal pain, vomiting, diarrhea, melena, hematochezia, constipation or urinary symptoms.       Preventative Health Maintenance:  In regards to preventative health maintenance, last Tdap received unknown. Flu shot due at this time. Pneumonia vaccination series not previously initiated per records. In regards to CRC screening, no prior screening on file though she does admit to having successfully completed a colonoscopy through [facility] within the last 5 years. Shingles vaccination series previously  initiated in 2021 with patient currently due for second and final booster. COVID-19 vaccine series completed with patient currently due for booster. Last Mammogram completed in 2021 per chart records.  Order given for mammogram and DEXA at last encounter which is yet to be completed last Pap completed in 2021.    As previously noted chronic left shoulder osteoarthritis.  Has historically utilized meloxicam and Tylenol.  Does admit to intermittent sleep disruption.  Plan was set for orthopedic follow-up when she had appropriately completed rehabilitation of her knee.  She is currently interested in finding consultation with a orthopedist.    Has noted over the last 2 weeks intermittent episodes of superficial left arm paresthesias.  Did ultimately start a B12 supplement noting that symptoms have improved.    Advanced directives: Organ donor    Current Medications  Current Outpatient Medications   Medication Instructions    cetirizine (ZYRTEC) 10 mg, oral, Daily    hydroCHLOROthiazide (MICROZIDE) 12.5 mg, oral, Daily before breakfast    meloxicam (MOBIC) 15 mg, oral, Daily    metoprolol succinate XL (TOPROL-XL) 50 mg, oral, Daily    omeprazole (PriLOSEC) 20 mg DR capsule take 1 capsule by mouth every day 30 minutes before morning meal        Allergies  Allergies   Allergen Reactions    Penicillin V Anaphylaxis    Sulfacetamide Sod-Sulfur-Urea Anaphylaxis    Sulfacetamide Sodium-Sulfur Anaphylaxis        Past Medical History:   Diagnosis Date    Anxiety     GERD (gastroesophageal reflux disease)     Hyperlipidemia     Hypertension     CAITLYN (obstructive sleep apnea)     Osteoarthritis       Past Surgical History:   Procedure Laterality Date    BI STEREOTACTIC GUIDED BREAST RIGHT LOCALIZATION AND BIOPSY Right 08/19/2021    BI STEREOTACTIC GUIDED BREAST LOCALIZATION AND BIOPSY RIGHT [facility]    TOTAL KNEE ARTHROPLASTY Left 11/10/2023     No family history on file.  Social History     Tobacco Use    Smoking  status: Never    Smokeless tobacco: Never   Vaping Use    Vaping status: Never Used   Substance Use Topics    Alcohol use: Not Currently    Drug use: Never       ROS  All pertinent positive symptoms are included in the history of present illness.  All other systems have been reviewed and are negative and noncontributory to this patient's current ailments.      VITAL SIGNS  Vitals:    10/08/24 1630   BP: 130/82   Pulse: 69   Temp: 36.2 °C (97.1 °F)   SpO2: 95%       PHYSICAL EXAM  GENERAL APPEARANCE: alert and oriented, Pleasant and cooperative, No Acute Distress  HEENT: EOMI, PERRLA, MMM  HEART: RRR, normal S1S2, no murmurs, click or rubs  LUNGS: clear to auscultation bilaterally, no wheezes/rhonchi/rales  EXTREMITIES: no edema, normal ROM  SKIN: normal, no rash, unremarkable  NEUROLOGIC EXAM: non-focal exam  MUSCULOSKELETAL: no gross abnormalities  PSYCH: affect is normal, eye contact is good      Assessment/Plan   Problem List Items Addressed This Visit             ICD-10-CM    Gastroesophageal reflux disease K21.9     - Stable         Essential hypertension - Primary I10     - Blood pressure stable on current regimen  -Will continue on current medications along with healthy, balanced diet with moderation of salt/caffeine/alcohol         Mixed hyperlipidemia E78.2     - Will monitor cholesterol levels with blood work ordered to be completed at earliest convenience         Primary osteoarthritis, left shoulder M19.012     - Will continue with meloxicam regimen with plans to see orthopedics if symptoms persist/progress  -Shoulder orthopedic referral given         Relevant Orders    Referral to Orthopaedic Surgery    Vitamin D insufficiency E55.9     - Will monitor vitamin D level as levels with blood work ordered         Prediabetes R73.03     - Will check A1c sugar average with blood work ordered today  -Continue focus on healthy, low-fat with moderation of carbohydrates         B12 deficiency E53.8     - Check B12  level with blood work ordered         Relevant Orders    Vitamin B12     Other Visit Diagnoses         Codes    Chronic left shoulder pain     M25.512, G89.29    Well female exam with routine gynecological exam     Z01.419    Relevant Orders    Referral to Obstetrics / Gynecology    Encounter for immunization     Z23    Relevant Orders    Flu vaccine, trivalent, preservative free, HIGH-DOSE, age 65y+ (Fluzone)          Please complete panel of routine blood work at earliest convenience    Counseling:       Medication education:         Education:  The patient is counseled regarding potential side-effects of all new medications        Understanding:  Patient expressed understanding        Adherence:  No barriers to adherence identified    ** Please excuse any errors in grammar or translation related to this dictation. Voice recognition software was utilized to prepare this document. **

## 2024-10-08 NOTE — ASSESSMENT & PLAN NOTE
- Will monitor blood work for any deficiencies or metabolic abnormalities that may be contributing to her symptoms  -As you have noted symptom improvement with B12 supplement, would recommend continuing  -If symptoms persist/progress, can plan for further evaluation including possible imaging or specialist referral

## 2024-10-08 NOTE — PATIENT INSTRUCTIONS
Problem List Items Addressed This Visit             ICD-10-CM    Gastroesophageal reflux disease K21.9     - Stable         Essential hypertension - Primary I10     - Blood pressure stable on current regimen  -Will continue on current medications along with healthy, balanced diet with moderation of salt/caffeine/alcohol         Mixed hyperlipidemia E78.2     - Will monitor cholesterol levels with blood work ordered to be completed at earliest convenience         Primary osteoarthritis, left shoulder M19.012     - Will continue with meloxicam regimen with plans to see orthopedics if symptoms persist/progress  -Shoulder orthopedic referral given         Relevant Orders    Referral to Orthopaedic Surgery    Vitamin D insufficiency E55.9     - Will monitor vitamin D level as levels with blood work ordered         Prediabetes R73.03     - Will check A1c sugar average with blood work ordered today  -Continue focus on healthy, low-fat with moderation of carbohydrates         B12 deficiency E53.8     - Check B12 level with blood work ordered         Relevant Orders    Vitamin B12     Other Visit Diagnoses         Codes    Chronic left shoulder pain     M25.512, G89.29    Well female exam with routine gynecological exam     Z01.419    Relevant Orders    Referral to Obstetrics / Gynecology    Encounter for immunization     Z23    Relevant Orders    Flu vaccine, trivalent, preservative free, HIGH-DOSE, age 65y+ (Fluzone)          Please complete panel of routine blood work at earliest convenience    Counseling:       Medication education:         Education:  The patient is counseled regarding potential side-effects of all new medications        Understanding:  Patient expressed understanding        Adherence:  No barriers to adherence identified    ** Please excuse any errors in grammar or translation related to this dictation. Voice recognition software was utilized to prepare this document. **

## 2024-11-25 ENCOUNTER — LAB (OUTPATIENT)
Dept: LAB | Facility: LAB | Age: 69
End: 2024-11-25
Payer: COMMERCIAL

## 2024-11-25 DIAGNOSIS — Z11.59 NEED FOR HEPATITIS C SCREENING TEST: ICD-10-CM

## 2024-11-25 DIAGNOSIS — E53.8 B12 DEFICIENCY: ICD-10-CM

## 2024-11-25 DIAGNOSIS — R73.03 PREDIABETES: ICD-10-CM

## 2024-11-25 DIAGNOSIS — Z00.00 ROUTINE ADULT HEALTH MAINTENANCE: ICD-10-CM

## 2024-11-25 DIAGNOSIS — E55.9 VITAMIN D INSUFFICIENCY: ICD-10-CM

## 2024-11-25 DIAGNOSIS — I10 ESSENTIAL HYPERTENSION: ICD-10-CM

## 2024-11-25 DIAGNOSIS — E78.2 MIXED HYPERLIPIDEMIA: ICD-10-CM

## 2024-11-25 LAB
25(OH)D3 SERPL-MCNC: 95 NG/ML (ref 30–100)
ALBUMIN SERPL BCP-MCNC: 4.2 G/DL (ref 3.4–5)
ALP SERPL-CCNC: 56 U/L (ref 33–136)
ALT SERPL W P-5'-P-CCNC: 36 U/L (ref 7–45)
ANION GAP SERPL CALCULATED.3IONS-SCNC: 12 MMOL/L (ref 10–20)
AST SERPL W P-5'-P-CCNC: 29 U/L (ref 9–39)
BILIRUB SERPL-MCNC: 0.6 MG/DL (ref 0–1.2)
BUN SERPL-MCNC: 17 MG/DL (ref 6–23)
CALCIUM SERPL-MCNC: 9.4 MG/DL (ref 8.6–10.3)
CHLORIDE SERPL-SCNC: 103 MMOL/L (ref 98–107)
CHOLEST SERPL-MCNC: 207 MG/DL (ref 0–199)
CHOLEST/HDLC SERPL: 4.1 {RATIO}
CO2 SERPL-SCNC: 26 MMOL/L (ref 21–32)
CREAT SERPL-MCNC: 0.8 MG/DL (ref 0.5–1.05)
EGFRCR SERPLBLD CKD-EPI 2021: 79 ML/MIN/1.73M*2
ERYTHROCYTE [DISTWIDTH] IN BLOOD BY AUTOMATED COUNT: 12.7 % (ref 11.5–14.5)
EST. AVERAGE GLUCOSE BLD GHB EST-MCNC: 126 MG/DL
GLUCOSE SERPL-MCNC: 136 MG/DL (ref 74–99)
HBA1C MFR BLD: 6 %
HCT VFR BLD AUTO: 45.2 % (ref 36–46)
HCV AB SER QL: NONREACTIVE
HDLC SERPL-MCNC: 51.1 MG/DL
HGB BLD-MCNC: 15.2 G/DL (ref 12–16)
LDLC SERPL CALC-MCNC: 130 MG/DL
MCH RBC QN AUTO: 31.1 PG (ref 26–34)
MCHC RBC AUTO-ENTMCNC: 33.6 G/DL (ref 32–36)
MCV RBC AUTO: 93 FL (ref 80–100)
NON HDL CHOLESTEROL: 156 MG/DL (ref 0–149)
NRBC BLD-RTO: 0 /100 WBCS (ref 0–0)
PLATELET # BLD AUTO: 292 X10*3/UL (ref 150–450)
POTASSIUM SERPL-SCNC: 4.1 MMOL/L (ref 3.5–5.3)
PROT SERPL-MCNC: 6.7 G/DL (ref 6.4–8.2)
RBC # BLD AUTO: 4.88 X10*6/UL (ref 4–5.2)
SODIUM SERPL-SCNC: 137 MMOL/L (ref 136–145)
TRIGL SERPL-MCNC: 130 MG/DL (ref 0–149)
TSH SERPL-ACNC: 2.41 MIU/L (ref 0.44–3.98)
VIT B12 SERPL-MCNC: 685 PG/ML (ref 211–911)
VLDL: 26 MG/DL (ref 0–40)
WBC # BLD AUTO: 6.4 X10*3/UL (ref 4.4–11.3)

## 2024-11-25 PROCEDURE — G0472 HEP C SCREEN HIGH RISK/OTHER: HCPCS

## 2024-11-25 PROCEDURE — 80061 LIPID PANEL: CPT

## 2024-11-25 PROCEDURE — 82306 VITAMIN D 25 HYDROXY: CPT

## 2024-11-25 PROCEDURE — 36415 COLL VENOUS BLD VENIPUNCTURE: CPT

## 2024-11-25 PROCEDURE — 82607 VITAMIN B-12: CPT

## 2024-11-25 PROCEDURE — 85027 COMPLETE CBC AUTOMATED: CPT

## 2024-11-25 PROCEDURE — 83036 HEMOGLOBIN GLYCOSYLATED A1C: CPT

## 2024-11-25 PROCEDURE — 84443 ASSAY THYROID STIM HORMONE: CPT

## 2024-11-25 PROCEDURE — 80053 COMPREHEN METABOLIC PANEL: CPT

## 2024-12-02 ENCOUNTER — TELEMEDICINE (OUTPATIENT)
Dept: PRIMARY CARE | Facility: CLINIC | Age: 69
End: 2024-12-02
Payer: COMMERCIAL

## 2024-12-02 DIAGNOSIS — J06.9 ACUTE URI: Primary | ICD-10-CM

## 2024-12-02 PROCEDURE — 1160F RVW MEDS BY RX/DR IN RCRD: CPT | Performed by: FAMILY MEDICINE

## 2024-12-02 PROCEDURE — 1126F AMNT PAIN NOTED NONE PRSNT: CPT | Performed by: FAMILY MEDICINE

## 2024-12-02 PROCEDURE — 1036F TOBACCO NON-USER: CPT | Performed by: FAMILY MEDICINE

## 2024-12-02 PROCEDURE — 99214 OFFICE O/P EST MOD 30 MIN: CPT | Performed by: FAMILY MEDICINE

## 2024-12-02 PROCEDURE — 1159F MED LIST DOCD IN RCRD: CPT | Performed by: FAMILY MEDICINE

## 2024-12-02 RX ORDER — METHYLPREDNISOLONE 4 MG/1
TABLET ORAL
Qty: 21 TABLET | Refills: 0 | Status: SHIPPED | OUTPATIENT
Start: 2024-12-02 | End: 2024-12-08

## 2024-12-02 RX ORDER — ALBUTEROL SULFATE 90 UG/1
2 INHALANT RESPIRATORY (INHALATION) EVERY 4 HOURS PRN
Qty: 8 G | Refills: 1 | Status: SHIPPED | OUTPATIENT
Start: 2024-12-02 | End: 2025-12-02

## 2024-12-02 RX ORDER — BENZONATATE 100 MG/1
100 CAPSULE ORAL 3 TIMES DAILY PRN
Qty: 42 CAPSULE | Refills: 0 | Status: SHIPPED | OUTPATIENT
Start: 2024-12-02 | End: 2025-01-01

## 2024-12-02 RX ORDER — AZITHROMYCIN 250 MG/1
TABLET, FILM COATED ORAL
Qty: 6 TABLET | Refills: 0 | Status: SHIPPED | OUTPATIENT
Start: 2024-12-02 | End: 2024-12-07

## 2024-12-02 ASSESSMENT — PAIN SCALES - GENERAL: PAINLEVEL_OUTOF10: 0-NO PAIN

## 2024-12-02 NOTE — ASSESSMENT & PLAN NOTE
- Given your symptoms and duration of illness, we feel that you can benefit from antibiotic coverage at this time   - A prescription for azithromycin was sent to your pharmacy, please take this medication as prescribed  - Medrol Dosepak with Tessalon Perles and albuterol inhaler additionally sent to help with cough and respiratory sensitivity  - Recommend supportive care with increased fluid intake to thin secretions, Ibuprofen or Tylenol as needed for pain or fever, and steamy showers/saline nasal rinses to help clear the nasal passages   - You may consider tea with honey or a cinnamon stick as these have natural antiviral and antibiotic properties   - Call if symptoms worsen or do not improve with these treatments

## 2024-12-02 NOTE — PROGRESS NOTES
"       Outpatient Visit Note    Chief Complaint   Patient presents with    Cough     X3 weeks with wheezing and intermittent mucus. When walking up steps has to stop and take a breath. Tried musinex and mullein lung detox with no relief.        With patient's permission, this is a Telemedicine visit with video and audio. The provider and patient participated in this telemedicine encounter.    HPI:  Christina Arrington is a 69 y.o. female with a past medical history significant for hypertension, hyperlipidemia, prediabetes, anxiety, GERD, osteoarthritis, CAITLYN and vitamin D deficiency who presents to the office via telemedicine encounter secondary to upper respiratory complaints.  She was last seen in the office on 10/8/2024 for follow-up.    She reports approximately 3 weeks of intermittent upper respiratory congestion with productive cough and wheezing.  Has been attempting OTC Mucinex along with holistic \" lung detox\" regimen with no significant symptom improvement.  Has had increased exertional shortness of breath and coughing fits creating chest wall sensitivity.  No reports of fever, chills, abdominal pain, nausea or vomiting.  Has been frequently around her grandchildren who have similarly been unwell.    Has noted over the last 2 weeks intermittent episodes of superficial left arm paresthesias.  Did ultimately start a B12 supplement noting that symptoms have improved.  B12 levels were ultimately unremarkable with blood work recently completed at the end of November.  Has continued to have intermittent arm paresthesias.    Advanced directives: Organ donor       Current Medications  Current Outpatient Medications   Medication Instructions    albuterol (Ventolin HFA) 90 mcg/actuation inhaler 2 puffs, inhalation, Every 4 hours PRN    azithromycin (Zithromax) 250 mg tablet Take 2 tablets (500 mg) by mouth once daily for 1 day, THEN 1 tablet (250 mg) once daily for 4 days. Take 2 tabs (500 mg) by mouth today, than 1 " daily for 4 days..    benzonatate (TESSALON) 100 mg, oral, 3 times daily PRN, Do not crush or chew.    hydroCHLOROthiazide (MICROZIDE) 12.5 mg, oral, Daily before breakfast    meloxicam (MOBIC) 15 mg, oral, Daily    methylPREDNISolone (Medrol Dospak) 4 mg tablets Take as directed on package.    metoprolol succinate XL (TOPROL-XL) 50 mg, oral, Daily    omeprazole (PriLOSEC) 20 mg DR capsule take 1 capsule by mouth every day 30 minutes before morning meal        Allergies  Allergies   Allergen Reactions    Penicillin V Anaphylaxis    Sulfacetamide Sod-Sulfur-Urea Anaphylaxis    Sulfacetamide Sodium-Sulfur Anaphylaxis        Past Medical History:   Diagnosis Date    Anxiety     GERD (gastroesophageal reflux disease)     Hyperlipidemia     Hypertension     CAITLYN (obstructive sleep apnea)     Osteoarthritis       Past Surgical History:   Procedure Laterality Date    BI STEREOTACTIC GUIDED BREAST RIGHT LOCALIZATION AND BIOPSY Right 08/19/2021    BI STEREOTACTIC GUIDED BREAST LOCALIZATION AND BIOPSY RIGHT [facility]    TOTAL KNEE ARTHROPLASTY Left 11/10/2023     No family history on file.  Social History     Tobacco Use    Smoking status: Never    Smokeless tobacco: Never   Vaping Use    Vaping status: Never Used   Substance Use Topics    Alcohol use: Not Currently    Drug use: Never     Tobacco Use: Low Risk  (12/2/2024)    Patient History     Smoking Tobacco Use: Never     Smokeless Tobacco Use: Never     Passive Exposure: Not on file        ROS  All pertinent positive symptoms are included in the history of present illness.  All other systems have been reviewed and are negative and noncontributory to this patient's current ailments.    VITAL SIGNS  There were no vitals filed for this visit.  There were no vitals filed for this visit.   There is no height or weight on file to calculate BMI.   Patient is unable to provide    PHYSICAL EXAM  GENERAL APPEARANCE:  Alert and oriented x 3, Pleasant and cooperative, No  acute distress.   LUNGS:  No conversational dyspnea or cough during encounter.   PSYCH:  appropriate mood and affect, no difficulty with speech.   Telemedicine visit, no other exam component done.      Assessment/Plan   Problem List Items Addressed This Visit             ICD-10-CM    Acute URI - Primary J06.9     - Given your symptoms and duration of illness, we feel that you can benefit from antibiotic coverage at this time   - A prescription for azithromycin was sent to your pharmacy, please take this medication as prescribed  - Medrol Dosepak with Tessalon Perles and albuterol inhaler additionally sent to help with cough and respiratory sensitivity  - Recommend supportive care with increased fluid intake to thin secretions, Ibuprofen or Tylenol as needed for pain or fever, and steamy showers/saline nasal rinses to help clear the nasal passages   - You may consider tea with honey or a cinnamon stick as these have natural antiviral and antibiotic properties   - Call if symptoms worsen or do not improve with these treatments         Relevant Medications    methylPREDNISolone (Medrol Dospak) 4 mg tablets    azithromycin (Zithromax) 250 mg tablet    albuterol (Ventolin HFA) 90 mcg/actuation inhaler    benzonatate (Tessalon) 100 mg capsule       Counseling:       Medication education:         Education:  The patient is counseled regarding potential side-effects of all new medications        Understanding:  Patient expressed understanding        Adherence:  No barriers to adherence identified    ** Please excuse any errors in grammar or translation related to this dictation. Voice recognition software was utilized to prepare this document. **

## 2024-12-07 DIAGNOSIS — M19.012 PRIMARY OSTEOARTHRITIS, LEFT SHOULDER: ICD-10-CM

## 2024-12-07 DIAGNOSIS — I10 ESSENTIAL HYPERTENSION: ICD-10-CM

## 2024-12-07 DIAGNOSIS — K21.9 GASTROESOPHAGEAL REFLUX DISEASE WITHOUT ESOPHAGITIS: ICD-10-CM

## 2024-12-10 RX ORDER — HYDROCHLOROTHIAZIDE 12.5 MG/1
12.5 CAPSULE ORAL
Qty: 90 CAPSULE | Refills: 1 | Status: SHIPPED | OUTPATIENT
Start: 2024-12-10

## 2024-12-10 RX ORDER — MELOXICAM 15 MG/1
15 TABLET ORAL DAILY
Qty: 90 TABLET | Refills: 1 | Status: SHIPPED | OUTPATIENT
Start: 2024-12-10

## 2024-12-10 RX ORDER — METOPROLOL SUCCINATE 50 MG/1
50 TABLET, EXTENDED RELEASE ORAL DAILY
Qty: 90 TABLET | Refills: 1 | Status: SHIPPED | OUTPATIENT
Start: 2024-12-10

## 2024-12-10 RX ORDER — OMEPRAZOLE 20 MG/1
20 CAPSULE, DELAYED RELEASE ORAL
Qty: 90 CAPSULE | Refills: 1 | Status: SHIPPED | OUTPATIENT
Start: 2024-12-10

## 2025-03-03 ENCOUNTER — TELEPHONE (OUTPATIENT)
Dept: PRIMARY CARE | Facility: CLINIC | Age: 70
End: 2025-03-03
Payer: COMMERCIAL

## 2025-03-03 DIAGNOSIS — F40.228 AEROPHOBIA: ICD-10-CM

## 2025-03-03 DIAGNOSIS — T75.3XXA MOTION SICKNESS, INITIAL ENCOUNTER: Primary | ICD-10-CM

## 2025-03-03 RX ORDER — LORAZEPAM 1 MG/1
1 TABLET ORAL SEE ADMIN INSTRUCTIONS
Qty: 4 TABLET | Refills: 0 | Status: SHIPPED | OUTPATIENT
Start: 2025-03-03

## 2025-03-03 RX ORDER — SCOPOLAMINE 1 MG/3D
1 PATCH, EXTENDED RELEASE TRANSDERMAL
Qty: 4 PATCH | Refills: 2 | Status: SHIPPED | OUTPATIENT
Start: 2025-03-03 | End: 2025-05-02

## 2025-03-03 NOTE — TELEPHONE ENCOUNTER
Prescriptions sent for as needed medication to help with anxiety regarding flight as well as motion sickness while on trip
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
